# Patient Record
Sex: FEMALE | Race: WHITE | NOT HISPANIC OR LATINO | Employment: OTHER | URBAN - METROPOLITAN AREA
[De-identification: names, ages, dates, MRNs, and addresses within clinical notes are randomized per-mention and may not be internally consistent; named-entity substitution may affect disease eponyms.]

---

## 2019-11-19 ENCOUNTER — NEW REFERRAL (OUTPATIENT)
Dept: URBAN - METROPOLITAN AREA CLINIC 26 | Facility: CLINIC | Age: 84
End: 2019-11-19

## 2019-11-19 VITALS
WEIGHT: 160 LBS | DIASTOLIC BLOOD PRESSURE: 63 MMHG | HEIGHT: 61 IN | HEART RATE: 76 BPM | SYSTOLIC BLOOD PRESSURE: 107 MMHG | BODY MASS INDEX: 30.21 KG/M2

## 2019-11-19 DIAGNOSIS — E11.9: ICD-10-CM

## 2019-11-19 DIAGNOSIS — H43.813: ICD-10-CM

## 2019-11-19 DIAGNOSIS — H35.54: ICD-10-CM

## 2019-11-19 DIAGNOSIS — H35.3132: ICD-10-CM

## 2019-11-19 PROCEDURE — 92250 FUNDUS PHOTOGRAPHY W/I&R: CPT

## 2019-11-19 PROCEDURE — 92004 COMPRE OPH EXAM NEW PT 1/>: CPT

## 2019-11-19 PROCEDURE — 92134 CPTRZ OPH DX IMG PST SGM RTA: CPT

## 2019-11-19 ASSESSMENT — VISUAL ACUITY
OD_SC: 20/30
OS_SC: 20/70
OS_CC: 20/60
OD_CC: 20/30

## 2019-11-19 ASSESSMENT — TONOMETRY
OS_IOP_MMHG: 18
OD_IOP_MMHG: 17

## 2020-05-19 ENCOUNTER — FOLLOW UP (OUTPATIENT)
Dept: URBAN - METROPOLITAN AREA CLINIC 26 | Facility: CLINIC | Age: 85
End: 2020-05-19

## 2020-05-19 VITALS — BODY MASS INDEX: 30.21 KG/M2 | HEIGHT: 61 IN | WEIGHT: 160 LBS

## 2020-05-19 DIAGNOSIS — H35.54: ICD-10-CM

## 2020-05-19 DIAGNOSIS — E11.9: ICD-10-CM

## 2020-05-19 DIAGNOSIS — H43.813: ICD-10-CM

## 2020-05-19 DIAGNOSIS — H35.3132: ICD-10-CM

## 2020-05-19 PROCEDURE — 92250 FUNDUS PHOTOGRAPHY W/I&R: CPT

## 2020-05-19 PROCEDURE — 92014 COMPRE OPH EXAM EST PT 1/>: CPT

## 2020-05-19 ASSESSMENT — VISUAL ACUITY
OS_CC: 20/50-2
OD_PH: 20/60+1
OD_CC: 20/60-2

## 2020-05-19 ASSESSMENT — TONOMETRY
OS_IOP_MMHG: 15
OD_IOP_MMHG: 14

## 2021-04-13 NOTE — PATIENT DISCUSSION
Surgery Counseling: I have discussed the option of scheduling surgery versus following, as well as the risks, benefits and alternatives of cataract surgery with the patient. It was explained that the surgery is medically indicated at this time, and it can be performed at the patient's option as delaying will cause no further deterioration, therefore there is no rush and there is no harm in waiting to have surgery. It was also explained that there is no guarantee that removing the cataract will improve their vision. The patient understands and desires to proceed with cataract surgery with the implantation of an intraocular lens to improve vision for _reading and driving____. I have given the patient the prescribed regimen of the all-in-one drop to use before and after cataract surgery. They have elected to use the all-in-one option of Pred/Gati/Brom(prednisolone acetate,gatifloxacin,and bromfenac. Patient to administer as directed.

## 2021-04-13 NOTE — PATIENT DISCUSSION
CATARACTS, OU - WORSENING/VISUALLY SIGNIFICANT. SCHEDULE _OS_ FIRST THEN LATER IN _OD_ DISCUSSED OPTION OF __STANDARD OU___VS STANDARD/LENSX OU_VS PANOPTIX OU__. PATIENT UNDERSTANDS AND DESIRES STANDARD OU__AND_TO  [address] LENSX__.

## 2021-05-17 NOTE — PATIENT DISCUSSION
Pre-Op 2nd Eye Counseling: The patient has noticed an improvement in their visual symptoms in the operative eye. The patient complains of decreased vision in the fellow eye when __READING___. It was explained to the patient that the decision to proceed with cataract surgery in the fellow eye is entirely a separate decision from the surgical eye. All of the same risks, benefits and alternatives are reviewed with the patient again. The patient does feel the vision in the non-operative eye is limiting their daily activities and elects to proceed with cataract surgery in the __RIGHT_____ eye. . I have given the patient the prescribed regimen of  drops to use before and after cataract surgery. Patient to administer as directed.

## 2021-05-17 NOTE — PATIENT DISCUSSION
S/P PE IOL, OS___. DOING WELL. CONTINUE PRED-GATI-BROM IN THE SURGICAL EYE  FOR A TOTAL OF 3 WEEKS USE THEN DISCONTINUE. PATIENT DESIRES _STANDARD/LENSX______IOL FOR 2ND EYE. SCHEDULE CATARACT SURGERY.

## 2021-05-18 ENCOUNTER — FOLLOW UP (OUTPATIENT)
Dept: URBAN - METROPOLITAN AREA CLINIC 26 | Facility: CLINIC | Age: 86
End: 2021-05-18

## 2021-05-18 VITALS — HEIGHT: 61 IN | BODY MASS INDEX: 30.21 KG/M2 | WEIGHT: 160 LBS

## 2021-05-18 DIAGNOSIS — H35.54: ICD-10-CM

## 2021-05-18 DIAGNOSIS — H35.3132: ICD-10-CM

## 2021-05-18 DIAGNOSIS — H43.813: ICD-10-CM

## 2021-05-18 DIAGNOSIS — E11.9: ICD-10-CM

## 2021-05-18 PROCEDURE — 92250 FUNDUS PHOTOGRAPHY W/I&R: CPT

## 2021-05-18 PROCEDURE — 92014 COMPRE OPH EXAM EST PT 1/>: CPT

## 2021-05-18 ASSESSMENT — VISUAL ACUITY
OD_CC: 20/60+2
OS_CC: 20/60-2

## 2021-05-18 ASSESSMENT — TONOMETRY
OD_IOP_MMHG: 14
OS_IOP_MMHG: 14

## 2021-05-28 NOTE — PATIENT DISCUSSION
New Prescription: Alphagan P (brimonidine): drops: 0.1% 1 drop twice a day as directed into both eyes 05-

## 2021-05-28 NOTE — PATIENT DISCUSSION
ELEVATED IOP IN OFFICE TODAY. MULTIPLE SERIES OF DROPS INSERTED IN OFFICE. PATIENT LEAVING OFFICE WITH IOP 23.

## 2022-05-20 ENCOUNTER — FOLLOW UP (OUTPATIENT)
Dept: URBAN - METROPOLITAN AREA CLINIC 26 | Facility: CLINIC | Age: 87
End: 2022-05-20

## 2022-05-20 VITALS — BODY MASS INDEX: 30.21 KG/M2 | WEIGHT: 160 LBS | HEIGHT: 61 IN

## 2022-05-20 DIAGNOSIS — H35.3132: ICD-10-CM

## 2022-05-20 DIAGNOSIS — H04.123: ICD-10-CM

## 2022-05-20 DIAGNOSIS — H35.54: ICD-10-CM

## 2022-05-20 DIAGNOSIS — H02.836: ICD-10-CM

## 2022-05-20 DIAGNOSIS — H43.813: ICD-10-CM

## 2022-05-20 DIAGNOSIS — H02.833: ICD-10-CM

## 2022-05-20 DIAGNOSIS — E11.9: ICD-10-CM

## 2022-05-20 PROCEDURE — 92014 COMPRE OPH EXAM EST PT 1/>: CPT

## 2022-05-20 PROCEDURE — 92134 CPTRZ OPH DX IMG PST SGM RTA: CPT

## 2022-05-20 PROCEDURE — 92250 FUNDUS PHOTOGRAPHY W/I&R: CPT

## 2022-05-20 ASSESSMENT — VISUAL ACUITY
OD_CC: 20/50+2
OS_CC: 20/60-2

## 2022-05-20 ASSESSMENT — TONOMETRY
OS_IOP_MMHG: 15
OD_IOP_MMHG: 14

## 2023-01-25 NOTE — PATIENT DISCUSSION
Visually significant PCO present on exam today. Cannot improve vision with refraction/glasses at this time. Discussed treatment options including observation versus Yag capsulotomy. Laser recommended to improve vision. We discussed the risks/benefits of laser capsulotomy. All questions were answered. Patient elects to proceed.  Schedule Yag capsulotomy OS THEN OD.